# Patient Record
(demographics unavailable — no encounter records)

---

## 2024-12-20 NOTE — ASSESSMENT
[FreeTextEntry1] : 79 year old F with R breast cancer s/p surgery, scoliosis who presents for cardiac evaluation.  1) Dyspnea, not related to exertion 2) Palpitations, occasional - EKG 12/20/24 showed sinus rhythm without ischemic changes - Given FHx of CAD, I advised pt to undergo treadmill stress test. If equivocal, will plan for CCTA in the future - I advised pt to undergo TTE to r/o structural heart disease - I advised pt to undergo 1 week event monitor to r/o arrhythmia  3) Follow-up, pending above testing

## 2024-12-20 NOTE — HISTORY OF PRESENT ILLNESS
[FreeTextEntry1] : 79 year old F with R breast cancer s/p surgery, scoliosis who presents for cardiac evaluation.  EKG 12/20/24 showed sinus rhythm without ischemic changes Meds: for breast cancer and osteoporosis  Pt reports rare palpitations occurring randomly recently. She wonders if it is related to poor sleep. She is also concerned because her mother required a pacemaker. She also reports SOB when talking a lot but states she can walk "really fast" without limitation. She is worried because her brother had stent at age 70.  Last seen by Dr. 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 first time was mild, second time was so severe likely " to die". It lasted for half hours, she did not go to ER but next day to the PMD and referred to me. The chest pain, had no radiation, not associated with shortness of breath, no dizziness or palpitations. Subsequently, she was treated with nitrate and no more recurrence of chest pain, but with severe headache. She then has stress echocardiogram test with normal stress test for ischemia by ECG, and normal echocardiogram findings. She however with poor tolerances.  Since the last visit, no chest pain, still with poor tolerances.

## 2025-01-15 NOTE — REASON FOR VISIT
[Symptom and Test Evaluation] : symptom and test evaluation [Arrhythmia/ECG Abnorrmalities] : arrhythmia/ECG abnormalities [Coronary Artery Disease] : coronary artery disease [FreeTextEntry3] : Dr. Perdomo [FreeTextEntry1] : 79 year old F with R breast cancer s/p surgery, scoliosis, pre-DM who presents for f/u.  EKG 12/20/24 showed sinus rhythm without ischemic changes Meds: for breast cancer and osteoporosis

## 2025-01-15 NOTE — HISTORY OF PRESENT ILLNESS
[FreeTextEntry1] : Pt reports palpitations yesterday that went away after 1-2 hours. She currently feels okay. She underwent CCTA 1/7/25 as ordered by Dr. Perdomo which showed Ca score 4 and minimal CAD. She denies chest pain or SOB at this time.   12/20/24 - Pt reports rare palpitations occurring randomly recently. She wonders if it is related to poor sleep. She is also concerned because her mother required a pacemaker. She also reports SOB when talking a lot but states she can walk "really fast" without limitation. She is worried because her brother had stent at age 70.  Last seen by Dr. 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 first time was mild, second time was so severe likely " to die". It lasted for half hours, she did not go to ER but next day to the PMD and referred to me. The chest pain, had no radiation, not associated with shortness of breath, no dizziness or palpitations. Subsequently, she was treated with nitrate and no more recurrence of chest pain, but with severe headache. She then has stress echocardiogram test with normal stress test for ischemia by ECG, and normal echocardiogram findings. She however with poor tolerances.  Since the last visit, no chest pain, still with poor tolerances.

## 2025-01-15 NOTE — ASSESSMENT
[FreeTextEntry1] : 79 year old F with R breast cancer s/p surgery, scoliosis who presents for f/u.  1) CAD, minimal - Dr. Perdomo ordered CCTA 1/7/515 showing Ca score 4 with minimal CAD and 6mm SREE lung nodule - Recommend starting atorvastatin 20mg daily  - Advised to f/u with Dr. Perdomo re: lung nodule  2) Palpitations 3) pSVT - 1 week monitor showed sinus rhythm , avg 75 bpm, 1.3% PACs, <0.01% PVCs, 24 pSVT episodes occurred, fastest 189 bpm and longest for 16 beats.  - Pt underwent TTE 1/15/25 which showed grossly hyperdynamic LV function, normal RV size/function, trace MR/TR, and no AI  - Start Toprol 25mg XL daily  4) Follow-up, 3 months

## 2025-03-19 NOTE — ASSESSMENT
[FreeTextEntry1] : 79 year old F with R breast cancer s/p surgery, scoliosis, pre-DM who presents for f/u.  1) CAD, minimal - Dr. Perdomo ordered CCTA 1/7/515 showing Ca score 4 with minimal CAD and 6mm SREE lung nodule - Continue atorvastatin 20mg daily  2) SOB,  3) Lung nodule - TTE 1/15/25 showed grossly hyperdynamic LV function, normal RV size/function, trace MR/TR, and no AI - Pt's PCP Dr. Perdomo retired. I advised pt to see pulmonary to f/u lung nodule and work-up SOB  4) pSVT - 1 week monitor showed sinus rhythm , avg 75 bpm, 1.3% PACs, <0.01% PVCs, 24 pSVT episodes occurred, fastest 189 bpm and longest for 16 beats. - Continue Toprol 25mg XL daily  5) Follow-up, 3 months or sooner if symptomatic

## 2025-03-19 NOTE — REASON FOR VISIT
[Symptom and Test Evaluation] : symptom and test evaluation [FreeTextEntry1] : 79 year old F with R breast cancer s/p surgery, scoliosis, thyroid nodules, and pre-DM who presents for f/u.  Meds: Toprol 25, atorvastatin 20, meds for breast cancer and osteoporosis  Carotid duplex 3/12/25 showed b/l common carotid arteries with mild stenosis (<50%)

## 2025-03-19 NOTE — HISTORY OF PRESENT ILLNESS
[FreeTextEntry1] : Pt reports occasional SOB that occurs randomly. She denies CP or palpitations at this time. No orthopnea, PND, or LE edema.  1/15/25 - Pt reports palpitations yesterday that went away after 1-2 hours. She currently feels okay. She underwent CCTA 1/7/25 as ordered by Dr. Perdomo which showed Ca score 4 and minimal CAD. She denies chest pain or SOB at this time.  12/20/24 - Pt reports rare palpitations occurring randomly recently. She wonders if it is related to poor sleep. She is also concerned because her mother required a pacemaker. She also reports SOB when talking a lot but states she can walk "really fast" without limitation. She is worried because her brother had stent at age 70.  Last seen by Dr. 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 first time was mild, second time was so severe likely " to die". It lasted for half hours, she did not go to ER but next day to the PMD and referred to me. The chest pain, had no radiation, not associated with shortness of breath, no dizziness or palpitations. Subsequently, she was treated with nitrate and no more recurrence of chest pain, but with severe headache. She then has stress echocardiogram test with normal stress test for ischemia by ECG, and normal echocardiogram findings. She however with poor tolerances.  Since the last visit, no chest pain, still with poor tolerances.